# Patient Record
Sex: MALE | Race: WHITE | NOT HISPANIC OR LATINO | URBAN - METROPOLITAN AREA
[De-identification: names, ages, dates, MRNs, and addresses within clinical notes are randomized per-mention and may not be internally consistent; named-entity substitution may affect disease eponyms.]

---

## 2019-06-10 ENCOUNTER — INPATIENT (INPATIENT)
Facility: HOSPITAL | Age: 69
LOS: 0 days | Discharge: HOME | DRG: 66 | End: 2019-06-11
Attending: SURGERY | Admitting: SURGERY
Payer: MEDICARE

## 2019-06-10 VITALS
RESPIRATION RATE: 18 BRPM | DIASTOLIC BLOOD PRESSURE: 94 MMHG | HEIGHT: 72 IN | OXYGEN SATURATION: 99 % | HEART RATE: 89 BPM | SYSTOLIC BLOOD PRESSURE: 150 MMHG | WEIGHT: 260.37 LBS | TEMPERATURE: 98 F

## 2019-06-10 DIAGNOSIS — I48.91 UNSPECIFIED ATRIAL FIBRILLATION: ICD-10-CM

## 2019-06-10 DIAGNOSIS — I60.9 NONTRAUMATIC SUBARACHNOID HEMORRHAGE, UNSPECIFIED: ICD-10-CM

## 2019-06-10 DIAGNOSIS — R55 SYNCOPE AND COLLAPSE: ICD-10-CM

## 2019-06-10 LAB
ANION GAP SERPL CALC-SCNC: 12 MMOL/L — SIGNIFICANT CHANGE UP (ref 5–17)
ANION GAP SERPL CALC-SCNC: 14 MMOL/L — SIGNIFICANT CHANGE UP (ref 5–17)
ANION GAP SERPL CALC-SCNC: 15 MMOL/L — SIGNIFICANT CHANGE UP (ref 5–17)
APTT BLD: 44.1 SEC — HIGH (ref 27.5–36.3)
APTT BLD: 51.8 SEC — HIGH (ref 27.5–36.3)
APTT BLD: 56.4 SEC — HIGH (ref 27.5–36.3)
BASOPHILS # BLD AUTO: 0 K/UL — SIGNIFICANT CHANGE UP (ref 0–0.2)
BASOPHILS # BLD AUTO: 0 K/UL — SIGNIFICANT CHANGE UP (ref 0–0.2)
BASOPHILS NFR BLD AUTO: 0.2 % — SIGNIFICANT CHANGE UP (ref 0–2)
BASOPHILS NFR BLD AUTO: 0.3 % — SIGNIFICANT CHANGE UP (ref 0–2)
BLD GP AB SCN SERPL QL: SIGNIFICANT CHANGE UP
BUN SERPL-MCNC: 16 MG/DL — SIGNIFICANT CHANGE UP (ref 8–20)
BUN SERPL-MCNC: 16 MG/DL — SIGNIFICANT CHANGE UP (ref 8–20)
BUN SERPL-MCNC: 20 MG/DL — SIGNIFICANT CHANGE UP (ref 8–20)
CALCIUM SERPL-MCNC: 8.7 MG/DL — SIGNIFICANT CHANGE UP (ref 8.6–10.2)
CALCIUM SERPL-MCNC: 9 MG/DL — SIGNIFICANT CHANGE UP (ref 8.6–10.2)
CALCIUM SERPL-MCNC: 9.3 MG/DL — SIGNIFICANT CHANGE UP (ref 8.6–10.2)
CHLORIDE SERPL-SCNC: 108 MMOL/L — HIGH (ref 98–107)
CHLORIDE SERPL-SCNC: 108 MMOL/L — HIGH (ref 98–107)
CHLORIDE SERPL-SCNC: 109 MMOL/L — HIGH (ref 98–107)
CO2 SERPL-SCNC: 21 MMOL/L — LOW (ref 22–29)
CO2 SERPL-SCNC: 22 MMOL/L — SIGNIFICANT CHANGE UP (ref 22–29)
CO2 SERPL-SCNC: 22 MMOL/L — SIGNIFICANT CHANGE UP (ref 22–29)
CREAT SERPL-MCNC: 0.84 MG/DL — SIGNIFICANT CHANGE UP (ref 0.5–1.3)
CREAT SERPL-MCNC: 0.97 MG/DL — SIGNIFICANT CHANGE UP (ref 0.5–1.3)
CREAT SERPL-MCNC: 1.01 MG/DL — SIGNIFICANT CHANGE UP (ref 0.5–1.3)
EOSINOPHIL # BLD AUTO: 0.1 K/UL — SIGNIFICANT CHANGE UP (ref 0–0.5)
EOSINOPHIL # BLD AUTO: 0.1 K/UL — SIGNIFICANT CHANGE UP (ref 0–0.5)
EOSINOPHIL NFR BLD AUTO: 0.6 % — SIGNIFICANT CHANGE UP (ref 0–5)
EOSINOPHIL NFR BLD AUTO: 1.2 % — SIGNIFICANT CHANGE UP (ref 0–6)
GLUCOSE SERPL-MCNC: 124 MG/DL — HIGH (ref 70–115)
GLUCOSE SERPL-MCNC: 127 MG/DL — HIGH (ref 70–115)
GLUCOSE SERPL-MCNC: 151 MG/DL — HIGH (ref 70–115)
HCT VFR BLD CALC: 47.4 % — SIGNIFICANT CHANGE UP (ref 42–52)
HCT VFR BLD CALC: 47.6 % — SIGNIFICANT CHANGE UP (ref 42–52)
HCT VFR BLD CALC: 47.6 % — SIGNIFICANT CHANGE UP (ref 42–52)
HGB BLD-MCNC: 15.7 G/DL — SIGNIFICANT CHANGE UP (ref 14–18)
HGB BLD-MCNC: 16 G/DL — SIGNIFICANT CHANGE UP (ref 14–18)
HGB BLD-MCNC: 16.1 G/DL — SIGNIFICANT CHANGE UP (ref 14–18)
INR BLD: 1.42 RATIO — HIGH (ref 0.88–1.16)
INR BLD: 1.49 RATIO — HIGH (ref 0.88–1.16)
INR BLD: 1.84 RATIO — HIGH (ref 0.88–1.16)
INR BLD: 3.48 RATIO — HIGH (ref 0.88–1.16)
LYMPHOCYTES # BLD AUTO: 1 K/UL — SIGNIFICANT CHANGE UP (ref 1–4.8)
LYMPHOCYTES # BLD AUTO: 2 K/UL — SIGNIFICANT CHANGE UP (ref 1–4.8)
LYMPHOCYTES # BLD AUTO: 28.1 % — SIGNIFICANT CHANGE UP (ref 20–55)
LYMPHOCYTES # BLD AUTO: 9.5 % — LOW (ref 20–55)
MAGNESIUM SERPL-MCNC: 2.2 MG/DL — SIGNIFICANT CHANGE UP (ref 1.6–2.6)
MAGNESIUM SERPL-MCNC: 2.4 MG/DL — SIGNIFICANT CHANGE UP (ref 1.6–2.6)
MCHC RBC-ENTMCNC: 28.3 PG — SIGNIFICANT CHANGE UP (ref 27–31)
MCHC RBC-ENTMCNC: 29 PG — SIGNIFICANT CHANGE UP (ref 27–31)
MCHC RBC-ENTMCNC: 29.1 PG — SIGNIFICANT CHANGE UP (ref 27–31)
MCHC RBC-ENTMCNC: 33 G/DL — SIGNIFICANT CHANGE UP (ref 32–36)
MCHC RBC-ENTMCNC: 33.8 G/DL — SIGNIFICANT CHANGE UP (ref 32–36)
MCHC RBC-ENTMCNC: 33.8 G/DL — SIGNIFICANT CHANGE UP (ref 32–36)
MCV RBC AUTO: 85.6 FL — SIGNIFICANT CHANGE UP (ref 80–94)
MCV RBC AUTO: 85.8 FL — SIGNIFICANT CHANGE UP (ref 80–94)
MCV RBC AUTO: 86.2 FL — SIGNIFICANT CHANGE UP (ref 80–94)
MONOCYTES # BLD AUTO: 0.5 K/UL — SIGNIFICANT CHANGE UP (ref 0–0.8)
MONOCYTES # BLD AUTO: 0.5 K/UL — SIGNIFICANT CHANGE UP (ref 0–0.8)
MONOCYTES NFR BLD AUTO: 4.9 % — SIGNIFICANT CHANGE UP (ref 3–10)
MONOCYTES NFR BLD AUTO: 7.6 % — SIGNIFICANT CHANGE UP (ref 3–10)
NEUTROPHILS # BLD AUTO: 4.3 K/UL — SIGNIFICANT CHANGE UP (ref 1.8–8)
NEUTROPHILS # BLD AUTO: 9.2 K/UL — HIGH (ref 1.8–8)
NEUTROPHILS NFR BLD AUTO: 62.5 % — SIGNIFICANT CHANGE UP (ref 37–73)
NEUTROPHILS NFR BLD AUTO: 84.6 % — HIGH (ref 37–73)
PHOSPHATE SERPL-MCNC: 2.4 MG/DL — SIGNIFICANT CHANGE UP (ref 2.4–4.7)
PHOSPHATE SERPL-MCNC: 3.3 MG/DL — SIGNIFICANT CHANGE UP (ref 2.4–4.7)
PLATELET # BLD AUTO: 122 K/UL — LOW (ref 150–400)
PLATELET # BLD AUTO: 126 K/UL — LOW (ref 150–400)
PLATELET # BLD AUTO: 131 K/UL — LOW (ref 150–400)
POTASSIUM SERPL-MCNC: 3.9 MMOL/L — SIGNIFICANT CHANGE UP (ref 3.5–5.3)
POTASSIUM SERPL-MCNC: 4.3 MMOL/L — SIGNIFICANT CHANGE UP (ref 3.5–5.3)
POTASSIUM SERPL-MCNC: 4.3 MMOL/L — SIGNIFICANT CHANGE UP (ref 3.5–5.3)
POTASSIUM SERPL-SCNC: 3.9 MMOL/L — SIGNIFICANT CHANGE UP (ref 3.5–5.3)
POTASSIUM SERPL-SCNC: 4.3 MMOL/L — SIGNIFICANT CHANGE UP (ref 3.5–5.3)
POTASSIUM SERPL-SCNC: 4.3 MMOL/L — SIGNIFICANT CHANGE UP (ref 3.5–5.3)
PROTHROM AB SERPL-ACNC: 16.5 SEC — HIGH (ref 10–12.9)
PROTHROM AB SERPL-ACNC: 17.3 SEC — HIGH (ref 10–12.9)
PROTHROM AB SERPL-ACNC: 21.6 SEC — HIGH (ref 10–12.9)
PROTHROM AB SERPL-ACNC: 41.6 SEC — HIGH (ref 10–12.9)
RBC # BLD: 5.5 M/UL — SIGNIFICANT CHANGE UP (ref 4.6–6.2)
RBC # BLD: 5.55 M/UL — SIGNIFICANT CHANGE UP (ref 4.6–6.2)
RBC # BLD: 5.56 M/UL — SIGNIFICANT CHANGE UP (ref 4.6–6.2)
RBC # FLD: 14.6 % — SIGNIFICANT CHANGE UP (ref 11–15.6)
RBC # FLD: 14.7 % — SIGNIFICANT CHANGE UP (ref 11–15.6)
RBC # FLD: 14.8 % — SIGNIFICANT CHANGE UP (ref 11–15.6)
SODIUM SERPL-SCNC: 142 MMOL/L — SIGNIFICANT CHANGE UP (ref 135–145)
SODIUM SERPL-SCNC: 144 MMOL/L — SIGNIFICANT CHANGE UP (ref 135–145)
SODIUM SERPL-SCNC: 145 MMOL/L — SIGNIFICANT CHANGE UP (ref 135–145)
TROPONIN T SERPL-MCNC: <0.01 NG/ML — SIGNIFICANT CHANGE UP (ref 0–0.06)
TROPONIN T SERPL-MCNC: <0.01 NG/ML — SIGNIFICANT CHANGE UP (ref 0–0.06)
TYPE + AB SCN PNL BLD: SIGNIFICANT CHANGE UP
WBC # BLD: 10.9 K/UL — HIGH (ref 4.8–10.8)
WBC # BLD: 6.9 K/UL — SIGNIFICANT CHANGE UP (ref 4.8–10.8)
WBC # BLD: 9.1 K/UL — SIGNIFICANT CHANGE UP (ref 4.8–10.8)
WBC # FLD AUTO: 10.9 K/UL — HIGH (ref 4.8–10.8)
WBC # FLD AUTO: 6.9 K/UL — SIGNIFICANT CHANGE UP (ref 4.8–10.8)
WBC # FLD AUTO: 9.1 K/UL — SIGNIFICANT CHANGE UP (ref 4.8–10.8)

## 2019-06-10 PROCEDURE — 99222 1ST HOSP IP/OBS MODERATE 55: CPT

## 2019-06-10 PROCEDURE — 70496 CT ANGIOGRAPHY HEAD: CPT | Mod: 26

## 2019-06-10 PROCEDURE — 71045 X-RAY EXAM CHEST 1 VIEW: CPT | Mod: 26

## 2019-06-10 PROCEDURE — 99232 SBSQ HOSP IP/OBS MODERATE 35: CPT

## 2019-06-10 PROCEDURE — 93010 ELECTROCARDIOGRAM REPORT: CPT

## 2019-06-10 PROCEDURE — 70498 CT ANGIOGRAPHY NECK: CPT | Mod: 26

## 2019-06-10 PROCEDURE — 70450 CT HEAD/BRAIN W/O DYE: CPT | Mod: 26,59,77

## 2019-06-10 PROCEDURE — 70450 CT HEAD/BRAIN W/O DYE: CPT | Mod: 26,59

## 2019-06-10 PROCEDURE — 72125 CT NECK SPINE W/O DYE: CPT | Mod: 26

## 2019-06-10 PROCEDURE — 99291 CRITICAL CARE FIRST HOUR: CPT

## 2019-06-10 PROCEDURE — 99053 MED SERV 10PM-8AM 24 HR FAC: CPT

## 2019-06-10 RX ORDER — PHYTONADIONE (VIT K1) 5 MG
5 TABLET ORAL ONCE
Refills: 0 | Status: COMPLETED | OUTPATIENT
Start: 2019-06-10 | End: 2019-06-10

## 2019-06-10 RX ORDER — CHLORHEXIDINE GLUCONATE 213 G/1000ML
1 SOLUTION TOPICAL DAILY
Refills: 0 | Status: DISCONTINUED | OUTPATIENT
Start: 2019-06-10 | End: 2019-06-11

## 2019-06-10 RX ORDER — WARFARIN SODIUM 2.5 MG/1
1 TABLET ORAL
Qty: 0 | Refills: 0 | DISCHARGE

## 2019-06-10 RX ORDER — DILTIAZEM HCL 120 MG
240 CAPSULE, EXT RELEASE 24 HR ORAL DAILY
Refills: 0 | Status: DISCONTINUED | OUTPATIENT
Start: 2019-06-10 | End: 2019-06-11

## 2019-06-10 RX ORDER — LANOLIN ALCOHOL/MO/W.PET/CERES
3 CREAM (GRAM) TOPICAL AT BEDTIME
Refills: 0 | Status: DISCONTINUED | OUTPATIENT
Start: 2019-06-10 | End: 2019-06-11

## 2019-06-10 RX ORDER — SODIUM CHLORIDE 9 MG/ML
500 INJECTION, SOLUTION INTRAVENOUS
Refills: 0 | Status: DISCONTINUED | OUTPATIENT
Start: 2019-06-10 | End: 2019-06-10

## 2019-06-10 RX ORDER — SODIUM CHLORIDE 9 MG/ML
1000 INJECTION INTRAMUSCULAR; INTRAVENOUS; SUBCUTANEOUS
Refills: 0 | Status: DISCONTINUED | OUTPATIENT
Start: 2019-06-10 | End: 2019-06-10

## 2019-06-10 RX ORDER — ACETAMINOPHEN 500 MG
650 TABLET ORAL EVERY 6 HOURS
Refills: 0 | Status: DISCONTINUED | OUTPATIENT
Start: 2019-06-10 | End: 2019-06-11

## 2019-06-10 RX ORDER — DILTIAZEM HCL 120 MG
240 CAPSULE, EXT RELEASE 24 HR ORAL DAILY
Refills: 0 | Status: DISCONTINUED | OUTPATIENT
Start: 2019-06-10 | End: 2019-06-10

## 2019-06-10 RX ORDER — PROTHROMBIN COMPLEX CONCENTRATE (HUMAN) 25.5; 16.5; 24; 22; 22; 26 [IU]/ML; [IU]/ML; [IU]/ML; [IU]/ML; [IU]/ML; [IU]/ML
1500 POWDER, FOR SOLUTION INTRAVENOUS ONCE
Refills: 0 | Status: COMPLETED | OUTPATIENT
Start: 2019-06-10 | End: 2019-06-10

## 2019-06-10 RX ORDER — DILTIAZEM HCL 120 MG
1 CAPSULE, EXT RELEASE 24 HR ORAL
Qty: 0 | Refills: 0 | DISCHARGE

## 2019-06-10 RX ADMIN — Medication 240 MILLIGRAM(S): at 17:32

## 2019-06-10 RX ADMIN — Medication 3 MILLIGRAM(S): at 22:42

## 2019-06-10 RX ADMIN — PROTHROMBIN COMPLEX CONCENTRATE (HUMAN) 1500 INTERNATIONAL UNIT(S): 25.5; 16.5; 24; 22; 22; 26 POWDER, FOR SOLUTION INTRAVENOUS at 01:36

## 2019-06-10 RX ADMIN — SODIUM CHLORIDE 75 MILLILITER(S): 9 INJECTION, SOLUTION INTRAVENOUS at 02:04

## 2019-06-10 RX ADMIN — Medication 650 MILLIGRAM(S): at 18:00

## 2019-06-10 RX ADMIN — Medication 650 MILLIGRAM(S): at 22:42

## 2019-06-10 RX ADMIN — Medication 101 MILLIGRAM(S): at 08:46

## 2019-06-10 RX ADMIN — Medication 650 MILLIGRAM(S): at 23:40

## 2019-06-10 RX ADMIN — PROTHROMBIN COMPLEX CONCENTRATE (HUMAN) 400 INTERNATIONAL UNIT(S): 25.5; 16.5; 24; 22; 22; 26 POWDER, FOR SOLUTION INTRAVENOUS at 01:28

## 2019-06-10 RX ADMIN — Medication 650 MILLIGRAM(S): at 17:32

## 2019-06-10 NOTE — ED ADULT NURSE NOTE - OBJECTIVE STATEMENT
pt alert and oriented x4 came in from [facility] c/o syncopal episode. pt states he just arrived from 6 hour flight, and was at the desk and had syncopal episode with positive LOC. pt reports being on coumadin. code trauma B called and canceleld by MD. pt denies pain. respirations even unlabored. pt in C collar. pt educated on plan of care, pt able to successfully teach back plan of care to RN, RN will continue to reeducate pt during hospital stay.

## 2019-06-10 NOTE — H&P ADULT - ATTENDING COMMENTS
Seen and examined.  Details per resident H+P.  Ground level fall.  Does not recall events.  +LOC.  Sustained small left frontoparietal SAH.  On Coumadin for Afib.  Admit to SICU for hourly neurological monitoring.  NS consult.  CTA head/neck done per NS to look for AVM.  Repeat head CT in a.m to assess stability.  Hold Coumadin.  Reverse INR (3.48).

## 2019-06-10 NOTE — H&P ADULT - NSHPPHYSICALEXAM_GEN_ALL_CORE
Constitutional: Patient resting comfortably in bed in no acute distress   HEENT: EOMI/PERRL b/l  Neck: No JVD, full ROM w/o pain  Respiratory: CTAB with unlabored respirations, no accessory muscle use or conversational dyspnea   Cardiovascular: Regular rate and rhythm with no arrythmias or murmurs  Gastrointestinal: Abdomen soft, non-tender, non-distended with no rebound tenderness or guarding   Rectal: Not indicated   Neurological: GCS 15 ([id number]) with no gross sensory, motor or coordination deficits   Psychiatric: Normal mood and affect   Musculoskeletal: No joint pain, swelling or deformity with no limitation of movement

## 2019-06-10 NOTE — DISCHARGE NOTE PROVIDER - CARE PROVIDER_API CALL
Curtis Rosario)  Neurosurgery  [facility], [address] [place] [address]  Phone: [phone]  Fax: [phone]  Follow Up Time:

## 2019-06-10 NOTE — DISCHARGE NOTE PROVIDER - NSDCCPCAREPLAN_GEN_ALL_CORE_FT
PRINCIPAL DISCHARGE DIAGNOSIS  Diagnosis: Subarachnoid bleed  Assessment and Plan of Treatment: Patient should follow with PCP. He needs referral to neurosurgeon at that time. He requires consultation via his cardiologists as well and he should have a syncope workup as unsure why he syncopized. If questions arise while stil in [place] patient should contact neurosurgeon and/or return to ED for further eval. Risks associated with leaving AMA discussed. Patient provided discharge information prior to leaving.      SECONDARY DISCHARGE DIAGNOSES  Diagnosis: Syncope  Assessment and Plan of Treatment: PRINCIPAL DISCHARGE DIAGNOSIS  Diagnosis: Subarachnoid bleed  Assessment and Plan of Treatment: Patient should follow with PCP. He needs referral to neurosurgeon at that time. He requires consultation via his cardiologists as well and he should have a syncope workup as unsure why he syncopized. If questions arise while stil in [place] patient should contact neurosurgeon and/or return to ED for further eval.      SECONDARY DISCHARGE DIAGNOSES  Diagnosis: Syncope  Assessment and Plan of Treatment:

## 2019-06-10 NOTE — ED PROVIDER NOTE - SKIN, MLM
Skin normal color for race, warm, dry and intact. No evidence of rash. Superficial abrasion to occipital scalp

## 2019-06-10 NOTE — DISCHARGE NOTE PROVIDER - HOSPITAL COURSE
HPI:    68 year old male s/p ground level syncopal fall found to have a left frontoparietal SAH. Patient seen and examined at bedside with on call surgery attending. Reports that he was slightly out of breath as he was rushing to get to the rental car service when he leaned on a desk and passed out. +LOC. Unable to recall events shortly thereafter. Currently denies any generalized body pain/aches at this time. ROS negative for fever, chills, nausea, vomiting, chest pain, SOB, abd pain, dysuria or changes in bowel habits.         Hospital Course:         Patient found on CT imaging to have a small Traumatic SAH. He was given Vitamin K and KCentra to reverse his INR (coumadin for Afib). Patient was admitted to the     SICU for neurological checks. He had a repeat CT head which was stable. Patient had his diet advanced and was tolerated. PT/OT was consulted for safe discharge. Patient desired discharge on HD#1 despite the primary team urging him to stay for patient safety and concern for his head bleed to worsen. Despite our attempts to have patient remain in the hospital for an additional 24 hours of observation he decided to leave AMA. Neurosurgery advised for patient to remain off Coumadin x 2 weeks. I advised patient to follow up with PCP upon discharge (patient resides in [place]). Patient should ask his PCP for referral for Neurosurgeon. I've attached our teams information should there be questions that arise in the interim. Patient will also need to follow up with his cardiologists and this was also stated to him. Patient understands all risks and recommendations. HPI:    68 year old male s/p ground level syncopal fall found to have a left frontoparietal SAH. Patient seen and examined at bedside with on call surgery attending. Reports that he was slightly out of breath as he was rushing to get to the rental car service when he leaned on a desk and passed out. +LOC. Unable to recall events shortly thereafter. Currently denies any generalized body pain/aches at this time. ROS negative for fever, chills, nausea, vomiting, chest pain, SOB, abd pain, dysuria or changes in bowel habits.         Hospital Course:         Patient found on CT imaging to have a small Traumatic SAH. He was given Vitamin K and KCentra to reverse his INR (coumadin for Afib). Patient was admitted to the     SICU for neurological checks. He had a repeat CT head which was stable. Patient had his diet advanced and was tolerated. PT/OT was consulted for safe discharge. Patient desired discharge on HD#1 despite the primary team urging him to stay for patient safety and concern for his head bleed to worsen. Despite our attempts to have patient remain in the hospital for an additional 24 hours of observation he decided to leave AMA.   Family then convinced pt to stay through the night.  Pt received and ECHO and repeat trop to complete his syncopal workup.  Pt remained neurologically intact throughout the night.  Tolerating PO, voids, pain well controlled.  Neurosurgery advised for patient to remain off Coumadin x 2 weeks. I advised patient to follow up with PCP upon discharge (patient resides in [place]). Patient should ask his PCP for referral for Neurosurgeon. I've attached our teams information should there be questions that arise in the interim. Patient will also need to follow up with his cardiologists and this was also stated to him. Patient understands all risks and recommendations.

## 2019-06-10 NOTE — CHART NOTE - NSCHARTNOTEFT_GEN_A_CORE
Pt seen and examined.    No complaints.  exertional syncope last night while rushing at airport.      NAD  AAOx4, non focal  Regular rate, irregular rhythm, +S1/S2.  no M/R/G.    Reviewed imaging and labs.   ECG - a-fib, controlled rate, non specific ST changes, no prior to compare.     CT stable traumatic SAH    INR rising.  Vit K given.    Extended discussion with pt regarding dx, prognosis and plan, which includes at least a full 24 hrs of neuro-observation and telemetry, as well as an echocardiogram.        Pt does not wish to stay in hospital any longer for observation and evaluation for traumatic SAH and syncope.  Have advised of the potential risks of leaving AMA including major cardiac event and/or worsening neurologic injury up to and including death or serious permanent disability.  Pt understands these risks and shows clear capacity for making medical decisions at this time.  Have advised to f/u with his own cardiologist for further syncope evaluation and to follow up with a neurosurgeon regarding the SAH.  Understands to not resume warfarin therapy until cleared to do so by neurosurgery.  Has also been advised that he should return to nearest ED if develops neuro deficits, chest pain, recurrent syncope, dyspnea.  Advised to return if he changes his mind and we will complete workup.

## 2019-06-10 NOTE — CONSULT NOTE ADULT - ASSESSMENT
IMP:  CT HEAD  with small foci of subarachnoid hemorrhage in the left   frontoparietal region.     syncope fall, patient on coumadin with inr 3.48,  k centra given    GCS=15  Neuro exam intact all    Plan:  ICU  Q1H NEURO CKS  HOB UP 35 DEGREES  SBP<150  INR GOAL <1.4  ASAP  NO ANTIPLATELETS. NO ANTICOAGS  NO SEDATION. NO NARCOTICS    CT HEAD 6 HRS FROM 1ST SCAN   OR STAT FOR ANY NEURO STATUS CHANGE  CTA HEAD AND NECK PENDING READ IF POSITIVE FOR ANEURYSM PATIENT WILL NEED TO GO TO [facility] IMP:  CT HEAD  with small foci of subarachnoid hemorrhage in the left   frontoparietal region.     syncope fall, patient on coumadin with inr 3.48,  k centra given    GCS=15  Neuro exam intact all    Plan:  ICU  Q1H NEURO CKS  HOB UP 35 DEGREES  SBP<150  INR GOAL <1.4  ASAP  NO ANTIPLATELETS. NO ANTICOAGS  NO SEDATION. NO NARCOTICS    CT HEAD 6 HRS FROM 1ST SCAN   OR STAT FOR ANY NEURO STATUS CHANGE

## 2019-06-10 NOTE — ED ADULT TRIAGE NOTE - CHIEF COMPLAINT QUOTE
BIBA c/o fall from standing height, possible LOC, pt on blood thinners, pt presents in C-collar, A&Ox4, no obvious deformities noted, no active bleeding noted, Trauma B called, MD Campos at bedside, priority CT called

## 2019-06-10 NOTE — ED PROVIDER NOTE - PROGRESS NOTE DETAILS
Spoke with trauma and neurosurgery, will come to evaluate patient. per dr camargo it is emergently medically necessary to perfrom cta head and neck to evaluate life threatening bleeding in this patient. risk benefits of ct with iv dye have been explained to pt and they agree to emergently perform ct without known creatinine results Re-evaluated pt, pt has GCS 15, pt was at rest when he had the syncopal episode after strenuous activity, will get CTA to evaluate for non-traumatic subarachnoid hemorrhage, continuing to follow pt closely

## 2019-06-10 NOTE — ED PROVIDER NOTE - CLINICAL SUMMARY MEDICAL DECISION MAKING FREE TEXT BOX
syncope without any overt head trauma priortiy ct scan gcs 15 ct reviewed spont vs traumatic sah kcentra ordered gcs remained 15. trauma nuerosurg recs implemented . multiple bedside neurological assessments performed

## 2019-06-10 NOTE — DISCHARGE NOTE PROVIDER - NSDCACTIVITY_GEN_ALL_CORE
Return to Work/School allowed/Do not make important decisions/Stairs allowed/Do not drive or operate machinery/No heavy lifting/straining/No restrictions/Showering allowed/Walking - Indoors allowed/Walking - Outdoors allowed

## 2019-06-10 NOTE — H&P ADULT - HISTORY OF PRESENT ILLNESS
68 year old male s/p ground level syncopal fall found to have a left frontoparietal SAH. Patient seen and examined at bedside with on call surgery attending. Reports that he was slightly out of breath as he was rushing to get to the rental car service when he leaned on a desk and passed out. +LOC. Unable to recall events shortly thereafter. Currently denies any generalized body pain/aches at this time. ROS negative for fever, chills, nausea, vomiting, chest pain, SOB, abd pain, dysuria or changes in bowel habits.     PMHx: Sleep apnea   PSHx: None   Allergies: None   Meds: Coumadin, diltiazem (cartia XT)   FamHx: Non contributory   SocHx: Denies toxic habits x 3

## 2019-06-10 NOTE — H&P ADULT - ASSESSMENT
68 year old male s/p fall found to have a left frontoparietal SAH. CTA of H&N requested by NSx team to r/u aneurysmal bleed. If no aneurysmal bleed, will require admission to SICU for hourly neurochecks.

## 2019-06-10 NOTE — PHYSICAL THERAPY INITIAL EVALUATION ADULT - ADDITIONAL COMMENTS
as per pt. lives in the private house with wife 3 steps with rail to enter, (-) DME, (+) driving, wife available to assist pt. as needed upon D/C home

## 2019-06-10 NOTE — ED PROVIDER NOTE - CARE PLAN
Principal Discharge DX:	Subarachnoid bleed Principal Discharge DX:	Subarachnoid bleed  Secondary Diagnosis:	Syncope

## 2019-06-10 NOTE — ED PROVIDER NOTE - OBJECTIVE STATEMENT
67 y/o M pt BIBA with hx of Afib presents to ED with wife  c/o headache and neck pain s/p syncopal episode at the airport today. C-collar in place on arrival. Pt's wife states they were rushing in the airport to get to the rental car service then he passed out and hit his head on the floor. Pt was unconscious for less than 1 minute and was confused when he woke up. Pt is on Coumadin for Afib. denies fever. denies HA or neck pain. no chest pain or sob. no abd pain. no n/v/d. no urinary f/u/d. no back pain. no motor or sensory deficits. denies illicit drug use. no recent travel. no rash. no other acute issues symptoms or concerns.

## 2019-06-10 NOTE — ED ADULT NURSE NOTE - NSIMPLEMENTINTERV_GEN_ALL_ED
Implemented All Fall Risk Interventions:  [place] to call system. Call bell, personal items and telephone within reach. Instruct patient to call for assistance. Room bathroom lighting operational. Non-slip footwear when patient is off stretcher. Physically safe environment: no spills, clutter or unnecessary equipment. Stretcher in lowest position, wheels locked, appropriate side rails in place. Provide visual cue, wrist band, yellow gown, etc. Monitor gait and stability. Monitor for mental status changes and reorient to person, place, and time. Review medications for side effects contributing to fall risk. Reinforce activity limits and safety measures with patient and family.

## 2019-06-10 NOTE — CHART NOTE - NSCHARTNOTEFT_GEN_A_CORE
Patient agitated this AM. Stating he will leave AMA. Repeat CT head was stable. Diet will be advanced. Ordered PT/OT consults for assessment. Will reach out to Neurosurgery consultant for plan regarding Coumadin usage as I suspect he will leave AMA in the next couple hours despite our attempt to have him stay. Will try and ensure as safe a discharge as possible prior to that happening.

## 2019-06-10 NOTE — CONSULT NOTE ADULT - SUBJECTIVE AND OBJECTIVE BOX
CC: Patient is a 68y old  Male who presents with a chief complaint of   SOURCE:   HPI: Patient is a 68y old  Male who presents with a chief complaint of     pt c/o + -headache  /10 on the pain scale   + - Nausea /+ - Vomiting  admits denies weakness  admits denies numbness/ tingling  admist denies visual changes  admist denies C/T/LS  Spine pain    PAST MEDICAL:     Sleep apnea, unspecified type  Atrial fibrillation, unspecified type      SURGICAL HISTORY:    SOCIAL HISTORY: + - EtOH, + - tobacco, + - drugs    FAMILY HISTORY:      ROS:  CONSTITUTIONAL: No fever, weight loss, or fatigue  EYES: No eye pain, visual disturbances, or discharge  ENMT:  No difficulty hearing, tinnitus, vertigo; No sinus or throat pain  NECK: No pain or stiffness  RESPIRATORY: No cough, wheezing, chills or hemoptysis; No shortness of breath  CARDIOVASCULAR: No chest pain, palpitations, dizziness, or leg swelling  GASTROINTESTINAL: No abdominal or epigastric pain. No nausea, vomiting, or hematemesis; No diarrhea or constipation. No melena or hematochezia.  GENITOURINARY: No dysuria, frequency, hematuria, or incontinence  NEUROLOGICAL: No headaches, memory loss, loss of strength, numbness, or tremors  SKIN: No itching, burning, rashes, or lesions   LYMPH NODES: No enlarged glands  ENDOCRINE: No heat or cold intolerance; No hair loss  MUSCULOSKELETAL: No joint pain or swelling; No muscle, back, or extremity pain  PSYCHIATRIC: No depression, anxiety, mood swings, or difficulty sleeping  HEME/LYMPH: No easy bruising, or bleeding gums  ALLERGY AND IMMUNOLOGIC: No hives or eczema      MEDICATIONS  (HOME):  COUMADIN      Allergies: No Known Allergies    Vital Signs Last 24 Hrs  T(C): 36.8 (10 Sohail 2019 00:41), Max: 36.8 (10 Sohail 2019 00:41)  T(F): 98.2 (10 Sohail 2019 00:41), Max: 98.2 (10 Sohail 2019 00:41)  HR: 89 (10 Sohail 2019 00:41) (89 - 89)  BP: 150/94 (10 Sohail 2019 00:41) (150/94 - 150/94)  BP(mean): --  RR: 18 (10 Sohail 2019 00:41) (18 - 18)  SpO2: 99% (10 Sohail 2019 00:41) (99% - 99%)    PHYSICAL EXAM:  GENERAL: NAD, well-groomed, well-developed  HEAD:  Atraumatic, Normocephalic  EYES: EOMI, PERRLA, conjunctiva and sclera clear  NECK: Supple, No JVD  NERVOUS SYSTEM:  Alert & Oriented X3, Good concentration; Motor Strength 5/5 B/L upper and lower extremities; DTRs 2+ intact and symmetric  CHEST/LUNG: Clear  bilaterally; No rales, rhonchi, wheezing, or rubs  HEART: Regular rate and rhythm; No murmurs, rubs, or gallops  ABDOMEN: Soft, Nontender, Nondistended; Bowel sounds present  EXTREMITIES:  2+ Peripheral Pulses, No clubbing, cyanosis, or edema  LYMPH: No lymphadenopathy noted  SKIN: No rashes or lesions      RADIOLOGY & ADDITIONAL STUDIES:  CT HEAD:   CT C SPINE:           PT/INR - ( 10 Sohail 2019 00:44 )   PT: 41.6 sec;   INR: 3.48 ratio         PTT - ( 10 Sohail 2019 00:44 )  PTT:51.8 sec CC: Patient is a 68y old  Male who presents with a chief complaint of syncope and fall.  SOURCE: Patient and patients wife  HPI: Patient is a 68y old  Male who presents with a chief complaint of syncope and fall. Loc for about 1 min per wife. No hx of similar in past. Patient with afib on coumadin. No symptoms   No injury from fall. No head, neck, chest back or extremity pain.     pt c/o  -headache   - Nausea /- Vomiting  denies weakness  denies numbness/ tingling  denies visual, hearing or speech changes  denies C/T/LS  Spine pain    PAST MEDICAL:     Sleep apnea, unspecified type  Atrial fibrillation, unspecified type      SURGICAL HISTORY:  card ablation 2010 and 2011  tonsillectomy     SOCIAL HISTORY: + EtOH socially,  - tobacco 3o yrs ago for 6-7 yrs 1ppd,  - drugs    FAMILY HISTORY: brother: colon ca  father: pneumonia      ROS:  CONSTITUTIONAL: No fever, weight loss, or fatigue  EYES: No eye pain, visual disturbances, or discharge  ENMT:  No difficulty hearing, tinnitus, vertigo; No sinus or throat pain  NECK: No pain or stiffness  RESPIRATORY: No cough, wheezing, chills or hemoptysis; No shortness of breath  CARDIOVASCULAR: No chest pain, +palpitations intermittent, no dizziness, or leg swelling  GASTROINTESTINAL: No abdominal or epigastric pain. No nausea, vomiting, or hematemesis; No diarrhea or constipation. No melena or hematochezia.  GENITOURINARY: No dysuria, frequency, hematuria, or incontinence  NEUROLOGICAL: No headaches, memory loss, loss of strength, numbness, or tremors  SKIN: No itching, burning, rashes, or lesions   LYMPH NODES: No enlarged glands  ENDOCRINE: No heat or cold intolerance; No hair loss  MUSCULOSKELETAL: No joint pain or swelling; No muscle, back, or extremity pain  PSYCHIATRIC: No depression, anxiety, mood swings, or difficulty sleeping  HEME/LYMPH: No easy bruising, or bleeding gums  ALLERGY AND IMMUNOLOGIC: No hives or eczema      MEDICATIONS  (HOME):  COUMADIN, Cartia xt      Allergies: No Known Allergies    Vital Signs Last 24 Hrs  T(C): 36.8 (10 Sohail 2019 00:41), Max: 36.8 (10 Sohail 2019 00:41)  T(F): 98.2 (10 Sohail 2019 00:41), Max: 98.2 (10 Sohail 2019 00:41)  HR: 89 (10 Sohail 2019 00:41) (89 - 89)  BP: 150/94 (10 Sohail 2019 00:41) (150/94 - 150/94)  BP(mean): --  RR: 18 (10 Sohail 2019 00:41) (18 - 18)  SpO2: 99% (10 Sohail 2019 00:41) (99% - 99%)    PHYSICAL EXAM:  GENERAL: NAD, well-groomed, well-developed  HEAD:  Atraumatic, Normocephalic  EYES: EOMI, PERRLA, conjunctiva and sclera clear  NECK: Supple, No JVD  NERVOUS SYSTEM:  Alert & Oriented X3, Good concentration;   perrla, eomi , cranial nerves 2-12 intact,   gross visual acuity and fields intact,  Motor Strength 5/5 B/L upper and lower extremities;  sensory intact all, fine motor and coordination intact all    CHEST/LUNG: Clear  bilaterally; No rales, rhonchi, wheezing, or rubs  HEART: Regular rate   ABDOMEN: Soft, Nontender, Nondistended; Bowel sounds present  EXTREMITIES:  2+ Peripheral Pulses, No clubbing, cyanosis, or edema  LYMPH: No lymphadenopathy noted  SKIN: No rashes or lesions      RADIOLOGY & ADDITIONAL STUDIES:    Head CT: There are small foci of subarachnoid hemorrhage in the left   frontoparietal region. There is no large cortical infarct, mass effect or   midline shift. Nonspecific mild periventricular and subcortical white matter   lucencies likely represent chronic microvascular ischemic changes. Small   lucency in the midline posterior kamilla may represent an age-indeterminate/old   infarct. Lucencies along the inferior aspect of the bilateral basal ganglia   are suggestive of prominent perivascular space.     There is no depressed skull fracture. There is mucosal thickening of the   sinuses with opacification and hyperostosis of the right frontal, ethmoid,   sphenoid and maxillary sinuses. There is a hyperattenuating focus in the   right maxillary sinus with bulging into the right maxillary sinus ostium,   suggesting obstruction of the ostiomeatal unit from a possible polyp or   mass. The tympanomastoid region is clear.     Cervical spine CT: There is osteopenia. There is reversal of the cervical   lordosis, which may be related to patient positioning and/or muscle spasm.   There is no obvious fracture or traumatic malalignment in the cervical   spine. The vertebral body heights are preserved in the cervical spine   without a compression deformity. The craniocervical junction is preserved.   Minimal asymmetry of the lateral atlantodental interval is likely due to   positioning and/or ligamentous laxity.     There are multilevel degenerative changes characterized by uncovertebral   degenerative change, disc osteophyte complex and facet arthropathy in the   cervical spine with resultant neural foraminal narrowing and spinal canal   stenosis.     The prevertebral soft tissue is within normal limits. There is no hematoma   in the visualized superficial soft tissue. Retropharyngeal course of   bilateral common carotid and proximal internal carotid arteries. Mildly   heterogeneous thyroid gland.     Visualized lung apices: No pneumothorax. Bovine arch.     Impression:     Head CT: Small left frontoparietal subarachnoid hemorrhage. No displaced   skull fracture.     Predominantly right-sided paranasal sinus disease as described.   Hyperattenuating focus in the right maxillary sinus with bulging into the   right maxillary sinus ostium, suggesting obstruction of the ostiomeatal unit   from a possible polyp or mass.   Recommend follow-up with ENT evaluation.     Cervical spine CT: Osteopenia without obvious fracture or traumatic   malalignment in the cervical spine. If there is clinical suspicion for acute   fracture or ligamentous/cord injury, MRI may be obtained for further   evaluation.     Mildly heterogeneous thyroid gland, which can be further characterized on a   nonemergent ultrasound as clinically indicated.         PT/INR - ( 10 Sohail 2019 00:44 )   PT: 41.6 sec;   INR: 3.48 ratio         PTT - ( 10 Sohail 2019 00:44 )  PTT:51.8 sec CC: Patient is a 68y old  Male who presents with a chief complaint of syncope and fall.  SOURCE: Patient and patients wife  HPI: Patient is a 68y old  Male who presents with a chief complaint of syncope and fall. Loc for about 1 min per wife. No hx of similar in past. Patient with afib on coumadin. No symptoms   No injury from fall. No head, neck, chest back or extremity pain.     pt c/o  -headache   - Nausea /- Vomiting  denies weakness  denies numbness/ tingling  denies visual, hearing or speech changes  denies C/T/LS  Spine pain    PAST MEDICAL:     Sleep apnea, unspecified type  Atrial fibrillation, unspecified type      SURGICAL HISTORY:  card ablation 2010 and 2011  tonsillectomy     SOCIAL HISTORY: + EtOH socially,  - tobacco 3o yrs ago for 6-7 yrs 1ppd,  - drugs    FAMILY HISTORY: brother: colon ca  father: pneumonia      ROS:  CONSTITUTIONAL: No fever, weight loss, or fatigue  EYES: No eye pain, visual disturbances, or discharge  ENMT:  No difficulty hearing, tinnitus, vertigo; No sinus or throat pain  NECK: No pain or stiffness  RESPIRATORY: No cough, wheezing, chills or hemoptysis; No shortness of breath  CARDIOVASCULAR: No chest pain, +palpitations intermittent, no dizziness, or leg swelling  GASTROINTESTINAL: No abdominal or epigastric pain. No nausea, vomiting, or hematemesis; No diarrhea or constipation. No melena or hematochezia.  GENITOURINARY: No dysuria, frequency, hematuria, or incontinence  NEUROLOGICAL: No headaches, memory loss, loss of strength, numbness, or tremors  SKIN: No itching, burning, rashes, or lesions   LYMPH NODES: No enlarged glands  ENDOCRINE: No heat or cold intolerance; No hair loss  MUSCULOSKELETAL: No joint pain or swelling; No muscle, back, or extremity pain  PSYCHIATRIC: No depression, anxiety, mood swings, or difficulty sleeping  HEME/LYMPH: No easy bruising, or bleeding gums  ALLERGY AND IMMUNOLOGIC: No hives or eczema      MEDICATIONS  (HOME):  COUMADIN, Cartia xt      Allergies: No Known Allergies    Vital Signs Last 24 Hrs  T(C): 36.8 (10 Sohail 2019 00:41), Max: 36.8 (10 Sohail 2019 00:41)  T(F): 98.2 (10 Sohail 2019 00:41), Max: 98.2 (10 Sohail 2019 00:41)  HR: 89 (10 Sohail 2019 00:41) (89 - 89)  BP: 150/94 (10 Sohail 2019 00:41) (150/94 - 150/94)  BP(mean): --  RR: 18 (10 Sohail 2019 00:41) (18 - 18)  SpO2: 99% (10 Sohail 2019 00:41) (99% - 99%)    PHYSICAL EXAM:  GENERAL: NAD, well-groomed, well-developed  HEAD:  Atraumatic, Normocephalic  EYES: EOMI, PERRLA, conjunctiva and sclera clear  NECK: Supple, No JVD  NERVOUS SYSTEM:  Alert & Oriented X3, Good concentration;   perrla, eomi , cranial nerves 2-12 intact,   gross visual acuity and fields intact,  Motor Strength 5/5 B/L upper and lower extremities;  sensory intact all, fine motor and coordination intact all    CHEST/LUNG: Clear  bilaterally; No rales, rhonchi, wheezing, or rubs  HEART: Regular rate   ABDOMEN: Soft, Nontender, Nondistended; Bowel sounds present  EXTREMITIES:  2+ Peripheral Pulses, No clubbing, cyanosis, or edema  LYMPH: No lymphadenopathy noted  SKIN: No rashes or lesions      RADIOLOGY & ADDITIONAL STUDIES:    Head CT: There are small foci of subarachnoid hemorrhage in the left   frontoparietal region. There is no large cortical infarct, mass effect or   midline shift. Nonspecific mild periventricular and subcortical white matter   lucencies likely represent chronic microvascular ischemic changes. Small   lucency in the midline posterior kamilla may represent an age-indeterminate/old   infarct. Lucencies along the inferior aspect of the bilateral basal ganglia   are suggestive of prominent perivascular space.     There is no depressed skull fracture. There is mucosal thickening of the   sinuses with opacification and hyperostosis of the right frontal, ethmoid,   sphenoid and maxillary sinuses. There is a hyperattenuating focus in the   right maxillary sinus with bulging into the right maxillary sinus ostium,   suggesting obstruction of the ostiomeatal unit from a possible polyp or   mass. The tympanomastoid region is clear.     Cervical spine CT: There is osteopenia. There is reversal of the cervical   lordosis, which may be related to patient positioning and/or muscle spasm.   There is no obvious fracture or traumatic malalignment in the cervical   spine. The vertebral body heights are preserved in the cervical spine   without a compression deformity. The craniocervical junction is preserved.   Minimal asymmetry of the lateral atlantodental interval is likely due to   positioning and/or ligamentous laxity.     There are multilevel degenerative changes characterized by uncovertebral   degenerative change, disc osteophyte complex and facet arthropathy in the   cervical spine with resultant neural foraminal narrowing and spinal canal   stenosis.     The prevertebral soft tissue is within normal limits. There is no hematoma   in the visualized superficial soft tissue. Retropharyngeal course of   bilateral common carotid and proximal internal carotid arteries. Mildly   heterogeneous thyroid gland.     Visualized lung apices: No pneumothorax. Bovine arch.     Impression:     Head CT: Small left frontoparietal subarachnoid hemorrhage. No displaced   skull fracture.     Predominantly right-sided paranasal sinus disease as described.   Hyperattenuating focus in the right maxillary sinus with bulging into the   right maxillary sinus ostium, suggesting obstruction of the ostiomeatal unit   from a possible polyp or mass.   Recommend follow-up with ENT evaluation.     Cervical spine CT: Osteopenia without obvious fracture or traumatic   malalignment in the cervical spine. If there is clinical suspicion for acute   fracture or ligamentous/cord injury, MRI may be obtained for further   evaluation.     Mildly heterogeneous thyroid gland, which can be further characterized on a   nonemergent ultrasound as clinically indicated.       CTA HEAD AND NECK:    CT ANGIOGRAPHY NECK:   VASCULATURE:   The cervical vasculature is patent without evidence of dissection.   There is mild atherosclerotic calcification at the origin of the left common   carotid artery and at both carotid bifurcations. There is no   hemodynamically significant carotid stenosis using NASCET criteria.     Medial deviation of the right common carotid artery and proximal right   internal carotid artery exerts mass effect on the hypopharynx. There is no   flow-limiting vertebral artery stenosis. The vertebral arteries are similar   in caliber.     CERVICAL SPINE: Disc osteophyte complexes and ossification of the posterior   longitudinal ligament cause mild to moderate spinal canal stenosis at C3-C4   through C6-C7. Uncovertebral/facet hypertrophy causes moderate to severe   neural foraminal narrowing at C4-C5 on the right, C5-C6 on the right and   C6-C7 on the left; and causes moderate neural foraminal narrowing at C3-C4   on the right, C4-C5 on the left, C5-C6 on the left and C6-C7 on the right.     SOFT TISSUES OF THE NECK: Thyroid is partially obscured by streak artifact.   The thyroid appears mildly enlarged and heterogeneous with lobulated   appearance likely reflecting the presence of multiple nodules.     UPPER CHEST: The visualized upper lungs are motion degraded but grossly clear     CT ANGIOGRAPHY BRAIN:   ANTERIOR CIRCULATION:   There are scattered atherosclerotic calcifications in the cavernous and   subchondral segments of the internal carotid arteries bilaterally. There is   no flow-limiting stenosis, occlusion or aneurysm in the intracranial   internal carotid arteries, anterior cerebral arteries, or middle cerebral   arteries.     POSTERIOR CIRCULATION:   There is moderate atherosclerotic disease and multiple mild-to-moderate   stenoses in the intradural vertebral arteries. The distal left vertebral   artery is larger than the right. Only a left posterior inferior cerebellar   arteries visualized.     There is no flow-limiting stenosis, occlusion or aneurysm in the basilar   artery or posterior cerebral arteries. There is a large left posterior   communicating artery and fetal origin of the left posterior cerebral artery.   A tiny right posterior communicating artery is partially visualized arising   from the ICA.     BRAIN: Trace left frontoparietal subarachnoid hemorrhage is unchanged.   There is no mass effect or abnormal enhancement. Gray matter-white matter   differentiation is grossly preserved.     There is complete opacification of the right frontal sinus, right frontal   nasal recess, anterior right ethmoid air cells, right maxillary sinus, right   sphenoid sinus and right sphenoethmoidal recess. The findings likely   represent chronic sinus obstruction due to underlying polyp or mass.     DURAL VENOUS SINUSES: The superficial and deep venous systems are patent.     IMPRESSION:   CT ANGIOGRAPHY NECK:   1. Patent cervical vasculature. No hemodynamically significant carotid   stenosis or flow-limiting vertebral artery stenosis. No evidence of   dissection   2. The thyroid appears mildly enlarged and heterogeneous with lobulated   appearance likely reflecting the presence of multiple nodules. Correlate   with physical exam findings and thyroid function tests. Ultrasound may be   obtained as clinically warranted.   3. Degenerative changes in the cervical spine with multilevel mild to   moderate spinal canal stenosis and multilevel moderate to severe neural   foraminal narrowing as discussed above.     CT ANGIOGRAPHY BRAIN:   1. Atherosclerotic disease causes multiple mild to moderate stenoses in the   intradural vertebral arteries bilaterally. Otherwise there is no major   vessel occlusion, stenosis or aneurysm is identified about the [place].   2. Trace left frontoparietal subarachnoid hemorrhage is stable.   3. Extensive right-sided sinus opacification suspicious for chronic sinus   obstruction due to underlying polyp or mass. ENT evaluation is recommended    PT/INR - ( 10 Sohail 2019 00:44 )   PT: 41.6 sec;   INR: 3.48 ratio         PTT - ( 10 Sohail 2019 00:44 )  PTT:51.8 sec

## 2019-06-10 NOTE — H&P ADULT - PROBLEM SELECTOR PLAN 1
1. f/u CT head and neck (performed)   2. Hold coumadin given elevated INR   3. Stat screening CXR ordered   4. Final dispo pending CTA read - if aneurysmal, may need transfer, if not, will require SICU admission for hourly neurochecks   5. Tertiary survey

## 2019-06-11 VITALS — TEMPERATURE: 98 F

## 2019-06-11 LAB
ANION GAP SERPL CALC-SCNC: 14 MMOL/L — SIGNIFICANT CHANGE UP (ref 5–17)
APTT BLD: 41.9 SEC — HIGH (ref 27.5–36.3)
BASOPHILS # BLD AUTO: 0 K/UL — SIGNIFICANT CHANGE UP (ref 0–0.2)
BASOPHILS NFR BLD AUTO: 0.5 % — SIGNIFICANT CHANGE UP (ref 0–2)
BUN SERPL-MCNC: 17 MG/DL — SIGNIFICANT CHANGE UP (ref 8–20)
CALCIUM SERPL-MCNC: 9.1 MG/DL — SIGNIFICANT CHANGE UP (ref 8.6–10.2)
CHLORIDE SERPL-SCNC: 108 MMOL/L — HIGH (ref 98–107)
CO2 SERPL-SCNC: 22 MMOL/L — SIGNIFICANT CHANGE UP (ref 22–29)
CREAT SERPL-MCNC: 0.93 MG/DL — SIGNIFICANT CHANGE UP (ref 0.5–1.3)
EOSINOPHIL # BLD AUTO: 0.1 K/UL — SIGNIFICANT CHANGE UP (ref 0–0.5)
EOSINOPHIL NFR BLD AUTO: 2 % — SIGNIFICANT CHANGE UP (ref 0–5)
GLUCOSE SERPL-MCNC: 112 MG/DL — SIGNIFICANT CHANGE UP (ref 70–115)
HCT VFR BLD CALC: 46 % — SIGNIFICANT CHANGE UP (ref 42–52)
HCV AB S/CO SERPL IA: 0.14 S/CO — SIGNIFICANT CHANGE UP (ref 0–0.99)
HCV AB SERPL-IMP: SIGNIFICANT CHANGE UP
HGB BLD-MCNC: 15.3 G/DL — SIGNIFICANT CHANGE UP (ref 14–18)
INR BLD: 1.29 RATIO — HIGH (ref 0.88–1.16)
LYMPHOCYTES # BLD AUTO: 1.9 K/UL — SIGNIFICANT CHANGE UP (ref 1–4.8)
LYMPHOCYTES # BLD AUTO: 29.7 % — SIGNIFICANT CHANGE UP (ref 20–55)
MAGNESIUM SERPL-MCNC: 2.1 MG/DL — SIGNIFICANT CHANGE UP (ref 1.6–2.6)
MCHC RBC-ENTMCNC: 28.7 PG — SIGNIFICANT CHANGE UP (ref 27–31)
MCHC RBC-ENTMCNC: 33.3 G/DL — SIGNIFICANT CHANGE UP (ref 32–36)
MCV RBC AUTO: 86.3 FL — SIGNIFICANT CHANGE UP (ref 80–94)
MONOCYTES # BLD AUTO: 0.5 K/UL — SIGNIFICANT CHANGE UP (ref 0–0.8)
MONOCYTES NFR BLD AUTO: 7.4 % — SIGNIFICANT CHANGE UP (ref 3–10)
NEUTROPHILS # BLD AUTO: 3.9 K/UL — SIGNIFICANT CHANGE UP (ref 1.8–8)
NEUTROPHILS NFR BLD AUTO: 60.2 % — SIGNIFICANT CHANGE UP (ref 37–73)
PHOSPHATE SERPL-MCNC: 3.4 MG/DL — SIGNIFICANT CHANGE UP (ref 2.4–4.7)
PLATELET # BLD AUTO: 119 K/UL — LOW (ref 150–400)
POTASSIUM SERPL-MCNC: 3.9 MMOL/L — SIGNIFICANT CHANGE UP (ref 3.5–5.3)
POTASSIUM SERPL-SCNC: 3.9 MMOL/L — SIGNIFICANT CHANGE UP (ref 3.5–5.3)
PROTHROM AB SERPL-ACNC: 15 SEC — HIGH (ref 10–12.9)
RBC # BLD: 5.33 M/UL — SIGNIFICANT CHANGE UP (ref 4.6–6.2)
RBC # FLD: 14.8 % — SIGNIFICANT CHANGE UP (ref 11–15.6)
SODIUM SERPL-SCNC: 144 MMOL/L — SIGNIFICANT CHANGE UP (ref 135–145)
TROPONIN T SERPL-MCNC: <0.01 NG/ML — SIGNIFICANT CHANGE UP (ref 0–0.06)
WBC # BLD: 6.5 K/UL — SIGNIFICANT CHANGE UP (ref 4.8–10.8)
WBC # FLD AUTO: 6.5 K/UL — SIGNIFICANT CHANGE UP (ref 4.8–10.8)

## 2019-06-11 PROCEDURE — 80048 BASIC METABOLIC PNL TOTAL CA: CPT

## 2019-06-11 PROCEDURE — 86900 BLOOD TYPING SEROLOGIC ABO: CPT

## 2019-06-11 PROCEDURE — 96374 THER/PROPH/DIAG INJ IV PUSH: CPT | Mod: XU

## 2019-06-11 PROCEDURE — 99285 EMERGENCY DEPT VISIT HI MDM: CPT | Mod: 25

## 2019-06-11 PROCEDURE — 70498 CT ANGIOGRAPHY NECK: CPT

## 2019-06-11 PROCEDURE — 86803 HEPATITIS C AB TEST: CPT

## 2019-06-11 PROCEDURE — 96375 TX/PRO/DX INJ NEW DRUG ADDON: CPT | Mod: XU

## 2019-06-11 PROCEDURE — 85730 THROMBOPLASTIN TIME PARTIAL: CPT

## 2019-06-11 PROCEDURE — 93306 TTE W/DOPPLER COMPLETE: CPT

## 2019-06-11 PROCEDURE — 99238 HOSP IP/OBS DSCHRG MGMT 30/<: CPT

## 2019-06-11 PROCEDURE — 83735 ASSAY OF MAGNESIUM: CPT

## 2019-06-11 PROCEDURE — 93005 ELECTROCARDIOGRAM TRACING: CPT

## 2019-06-11 PROCEDURE — 85610 PROTHROMBIN TIME: CPT

## 2019-06-11 PROCEDURE — 97167 OT EVAL HIGH COMPLEX 60 MIN: CPT

## 2019-06-11 PROCEDURE — 71045 X-RAY EXAM CHEST 1 VIEW: CPT

## 2019-06-11 PROCEDURE — 85027 COMPLETE CBC AUTOMATED: CPT

## 2019-06-11 PROCEDURE — 97163 PT EVAL HIGH COMPLEX 45 MIN: CPT

## 2019-06-11 PROCEDURE — 86901 BLOOD TYPING SEROLOGIC RH(D): CPT

## 2019-06-11 PROCEDURE — 84100 ASSAY OF PHOSPHORUS: CPT

## 2019-06-11 PROCEDURE — 70496 CT ANGIOGRAPHY HEAD: CPT

## 2019-06-11 PROCEDURE — 72125 CT NECK SPINE W/O DYE: CPT

## 2019-06-11 PROCEDURE — 84484 ASSAY OF TROPONIN QUANT: CPT

## 2019-06-11 PROCEDURE — 86850 RBC ANTIBODY SCREEN: CPT

## 2019-06-11 PROCEDURE — 70450 CT HEAD/BRAIN W/O DYE: CPT

## 2019-06-11 PROCEDURE — 99232 SBSQ HOSP IP/OBS MODERATE 35: CPT

## 2019-06-11 PROCEDURE — 36415 COLL VENOUS BLD VENIPUNCTURE: CPT

## 2019-06-11 RX ORDER — ACETAMINOPHEN 500 MG
2 TABLET ORAL
Qty: 0 | Refills: 0 | DISCHARGE
Start: 2019-06-11

## 2019-06-11 RX ADMIN — Medication 650 MILLIGRAM(S): at 07:58

## 2019-06-11 RX ADMIN — CHLORHEXIDINE GLUCONATE 1 APPLICATION(S): 213 SOLUTION TOPICAL at 11:04

## 2019-06-11 RX ADMIN — Medication 650 MILLIGRAM(S): at 07:43

## 2019-06-11 NOTE — PROGRESS NOTE ADULT - ASSESSMENT
67 yo male s/p syncopal event with fall and head trauma with small left frontoparietal SAH, Afib on coumadin.     PAST MEDICAL & SURGICAL HISTORY:  Sleep apnea, unspecified type  Atrial fibrillation, unspecified type    PLAN:  * INR 1.29 today  *continue to hold coumadin  *Cleared to go home today; instructed to follow up with PMD/ Neurosurgery in [place]  *Will need neurosurgery clearance prior to restarting AC         Assessment:  Please Check When Present   []  GCS  E   V  M     Heart Failure: []Acute, [] acute on chronic , []chronic  Heart Failure:  [] Diastolic (HFpEF), [] Systolic (HFrEF), []Combined (HFpEF and HFrEF), [] RHF, [] Pulm HTN, [] Other    [] YOJANA, [] ATN, [] AIN, [] other  [] CKD1, [] CKD2, [] CKD 3, [] CKD 4, [] CKD 5, []ESRD    Encephalopathy: [] Metabolic, [] Hepatic, [] toxic, [] Neurological, [] Other    Abnormal Nurtitional Status: [] malnurtition (see nutrition note), [ ]underweight: BMI < 19, [] morbid obesity: BMI >40, [] Cachexia    [] Sepsis  [] hypovolemic shock,[] cardiogenic shock, [] hemorrhagic shock, [] neuogenic shock  [] Acute Respiratory Failure  []Cerebral edema, [] Brain compression/ herniation,   [] Functional quadriplegia  [] Acute blood loss anemia    Will discuss plan with Dr. Rosario
A/p: 68 year old male s/p ground level syncopal fall found to have a left frontoparietal SAH, no neuro deficits, pending syncopal work up    -F/u echo  -F/u AM troponin  -Cont neuro checks q4hrs  -Reg diet  -Dvt ppx if stays past 24hrs  -Possible d/c home
67 yo male s/p syncopal event with fall and head trauma with small left frontoparietal SAH, Afib on coumadin.   - initial INR 3.48 s/p [facility], vit K rpt INR today 1.84  - rpt head CT stable   - continue holding Coumadin   - oob w/ pt   - all above d/w Dr Rosario on rounds

## 2019-06-11 NOTE — DISCHARGE NOTE NURSING/CASE MANAGEMENT/SOCIAL WORK - NSDCDPATPORTLINK_GEN_ALL_CORE
You can access the [facility] Patient Portal, offered by [facility], by registering with the following website: [email or web address]

## 2019-06-11 NOTE — PROGRESS NOTE ADULT - ATTENDING COMMENTS
Seen and examined.      Stable exam.  no complaints.      No concerning findings on echo or tele.      OK for discharge.  Understands follow up instructions.
NSGY Attg:    see above    patient seen    CT head with stable small tSAH    agree with plan as above    recommend INR less than 1.4

## 2019-06-11 NOTE — OCCUPATIONAL THERAPY INITIAL EVALUATION ADULT - ADDITIONAL COMMENTS
Pt lives in [place] in a house with no DESMOND and 4 steps inside up to bedroom and bathroom. Bathroom has bathtub with curtains and shower stall with doors. Pt does not own any DME. Pt is right handed. Pt drives. Pt's wife is able and available to assist upon discharge. Pt is currently here on vacation so will be discharged to hotel.

## 2019-06-11 NOTE — OCCUPATIONAL THERAPY INITIAL EVALUATION ADULT - PERTINENT HX OF CURRENT PROBLEM, REHAB EVAL
Pt presents to ED with c/o head and neck pain s/p syncope episode. Head CT reveals small left fronto-parietal SAH; no displaced skull fracture; predominantly right-sided paranasal sinus disease; hyper-attenuating focus in right maxillary sinus with bulging into right maxillary sinus ostium, suggesting obstruction of ostiomeatal unit from a possible polyp or mass. C-spine CT reveals osteopenia without obvious fracture or traumatic malalignment in c-spine; mildly heterogeneous thyroid gland.
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
## 2019-06-11 NOTE — DISCHARGE NOTE NURSING/CASE MANAGEMENT/SOCIAL WORK - NSDCPEPTSTRK_GEN_ALL_CORE
Stroke education booklet/Call 911 for stroke/Need for follow up after discharge/Signs and symptoms of stroke/Prescribed medications/Risk factors for stroke/Stroke support groups for patients, families, and friends/Stroke warning signs and symptoms

## 2019-06-11 NOTE — OCCUPATIONAL THERAPY INITIAL EVALUATION ADULT - LEVEL OF INDEPENDENCE: EATING, OT EVAL
not observed however pt reports just finishing breakfast prior to evaluation with no issues/concerns/independent

## 2020-06-30 NOTE — ED ADULT NURSE REASSESSMENT NOTE - FACIAL SYMMETRY
How Severe Is Your Skin Lesion?: mild
Have Your Skin Lesions Been Treated?: not been treated
Is This A New Presentation, Or A Follow-Up?: Growth
symmetrical
symmetrical
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
## 2024-02-20 NOTE — PHYSICAL THERAPY INITIAL EVALUATION ADULT - DIAGNOSIS, PT EVAL
Pt. not a candidate for skilled PT treatment due to lack of functional goals in current setting.
[FreeTextEntry1] : 15 year male Concern today for overweight. Discussed diet and lifestyle changes to implement moving forward. Patient agreed to plan and verbalized understanding. - diet changes- eat 3 meals/day, do not skip breakfast, cut back on fatty foods, sugar, carbs - encouraged increased physical activity/sports. -Discussed 5-2-1-0 healthy active living with patient and family   Lab tests reordered. Return in one month for weight check.

## 2024-07-22 NOTE — ED ADULT NURSE NOTE - CAS EDN DISCHARGE ASSESSMENT
Patient Name: Tay Novoa  : 1939    MRN: [id number]                              [place] (Bed Mobility) standby assist  -[facility] Name 07/22/24 1229          Strength Comprehensive (MMT)    Comment, General Manual Muscle Testing (MMT) Assessment BUE 4/5 MMT  -[facility] [place] Del Cid OT Occupational Therapist    ZBIGNIEW Yap  MARYAM Santoyo [place] Ortega OT GO 1                 [place] Ortega OT  7/22/2024   Alert and oriented to person, place and time

## 2025-02-25 NOTE — ED ADULT NURSE NOTE - NS ED NURSE REPORT GIVEN TO FT
Bill For Surgical Tray: no Performing Laboratory: [id number] Billing Type: Third-Party Bill Expected Date Of Service: 02/25/2025 Lab Facility: [id number]